# Patient Record
Sex: MALE | URBAN - METROPOLITAN AREA
[De-identification: names, ages, dates, MRNs, and addresses within clinical notes are randomized per-mention and may not be internally consistent; named-entity substitution may affect disease eponyms.]

---
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
## 2023-01-11 ENCOUNTER — TELEPHONE (OUTPATIENT)
Dept: TRANSPLANT | Facility: CLINIC | Age: 23
End: 2023-01-11

## 2023-01-11 DIAGNOSIS — Z00.5 TRANSPLANT DONOR EVALUATION: Primary | ICD-10-CM

## 2023-01-11 NOTE — TELEPHONE ENCOUNTER
Completed BREEZE questionnaire reviewed and independent living donor advocate assessment completed. Spoke with patient to arrange preliminary crossmatch. Lab appointment scheduled.  All questions were answered and patient verbalized understanding.

## 2023-01-17 ENCOUNTER — LAB VISIT (OUTPATIENT)
Dept: LAB | Facility: HOSPITAL | Age: 23
End: 2023-01-17
Payer: MEDICARE

## 2023-01-17 DIAGNOSIS — Z00.5 TRANSPLANT DONOR EVALUATION: ICD-10-CM

## 2023-01-17 LAB — ABO + RH BLD: NORMAL

## 2023-01-17 PROCEDURE — 81373 HLA I TYPING 1 LOCUS LR: CPT | Mod: PO,TXP | Performed by: NURSE PRACTITIONER

## 2023-01-17 PROCEDURE — 81376 HLA II TYPING 1 LOCUS LR: CPT | Mod: 59,PO,TXP | Performed by: NURSE PRACTITIONER

## 2023-01-17 PROCEDURE — 81373 HLA I TYPING 1 LOCUS LR: CPT | Mod: 59,PO,TXP | Performed by: NURSE PRACTITIONER

## 2023-01-17 PROCEDURE — 81376 HLA II TYPING 1 LOCUS LR: CPT | Mod: PO,TXP | Performed by: NURSE PRACTITIONER

## 2023-01-17 PROCEDURE — 86900 BLOOD TYPING SEROLOGIC ABO: CPT | Mod: TXP | Performed by: NURSE PRACTITIONER

## 2023-01-17 PROCEDURE — 36415 COLL VENOUS BLD VENIPUNCTURE: CPT | Mod: TXP | Performed by: NURSE PRACTITIONER

## 2023-01-19 LAB
HLATY INTERPRETATION: NORMAL
HLATY INTERPRETATION: NORMAL

## 2023-01-24 ENCOUNTER — TELEPHONE (OUTPATIENT)
Dept: TRANSPLANT | Facility: CLINIC | Age: 23
End: 2023-01-24
Payer: MEDICARE

## 2023-01-24 DIAGNOSIS — Z00.5 TRANSPLANT DONOR EVALUATION: Primary | ICD-10-CM

## 2023-01-24 NOTE — TELEPHONE ENCOUNTER
Spoke with donor regarding needing to repeat ABO.  Appointment set for tomorrow.  All questions answered

## 2023-01-25 ENCOUNTER — TELEPHONE (OUTPATIENT)
Dept: TRANSPLANT | Facility: CLINIC | Age: 23
End: 2023-01-25
Payer: MEDICARE

## 2023-01-25 ENCOUNTER — LAB VISIT (OUTPATIENT)
Dept: LAB | Facility: HOSPITAL | Age: 23
End: 2023-01-25
Payer: MEDICARE

## 2023-01-25 DIAGNOSIS — Z00.5 TRANSPLANT DONOR EVALUATION: Primary | ICD-10-CM

## 2023-01-25 DIAGNOSIS — Z00.5 TRANSPLANT DONOR EVALUATION: ICD-10-CM

## 2023-01-25 LAB — ABO + RH BLD: NORMAL

## 2023-01-25 PROCEDURE — 86900 BLOOD TYPING SEROLOGIC ABO: CPT | Mod: TXP | Performed by: NURSE PRACTITIONER

## 2023-01-25 PROCEDURE — 36415 COLL VENOUS BLD VENIPUNCTURE: CPT | Mod: TXP | Performed by: NURSE PRACTITIONER

## 2023-01-25 NOTE — TELEPHONE ENCOUNTER
Patient notified that the results of the crossmatch with his father show that they are compatible. Patient states he would like to proceed with the medical evaluation. Required testing was discussed including infectious disease and HIV testing. Opportunity provided for questions. Informed that he will be contacted to schedule appointments. Asked to  supplies tomorrow in transplant lab.  All questions were answered and patient verbalized understanding.

## 2023-01-25 NOTE — TELEPHONE ENCOUNTER
Patient notified that the results of the crossmatch with his dad show that they are not compatible. Patient states is unsure if he would want to participate in the paired exchange program.  Opportunity provided for questions. Will keep patient information and touch base with him if other donors do not work out.  All questions were answered and patient verbalized understanding.

## 2023-01-27 ENCOUNTER — TELEPHONE (OUTPATIENT)
Dept: TRANSPLANT | Facility: CLINIC | Age: 23
End: 2023-01-27
Payer: MEDICARE

## 2023-01-27 NOTE — TELEPHONE ENCOUNTER
Spoke with donor, confirmed appointment for 1/31.  24-hour urine instructions reviewed.  All questions answered.

## 2023-01-31 ENCOUNTER — HOSPITAL ENCOUNTER (OUTPATIENT)
Dept: RADIOLOGY | Facility: HOSPITAL | Age: 23
Discharge: HOME | End: 2023-01-31
Attending: TRANSPLANT SURGERY
Payer: MEDICARE

## 2023-01-31 ENCOUNTER — HOSPITAL ENCOUNTER (OUTPATIENT)
Dept: CARDIOLOGY | Facility: CLINIC | Age: 23
Discharge: HOME | End: 2023-01-31
Payer: MEDICARE

## 2023-01-31 ENCOUNTER — HOSPITAL ENCOUNTER (OUTPATIENT)
Dept: RADIOLOGY | Facility: HOSPITAL | Age: 23
Discharge: HOME | End: 2023-01-31
Attending: NURSE PRACTITIONER
Payer: MEDICARE

## 2023-01-31 ENCOUNTER — OFFICE VISIT (OUTPATIENT)
Dept: TRANSPLANT | Facility: CLINIC | Age: 23
End: 2023-01-31
Payer: MEDICARE

## 2023-01-31 VITALS
DIASTOLIC BLOOD PRESSURE: 86 MMHG | HEIGHT: 65 IN | WEIGHT: 152.56 LBS | SYSTOLIC BLOOD PRESSURE: 144 MMHG | OXYGEN SATURATION: 99 % | HEART RATE: 91 BPM | TEMPERATURE: 97 F | BODY MASS INDEX: 25.42 KG/M2 | RESPIRATION RATE: 16 BRPM

## 2023-01-31 DIAGNOSIS — Z00.5 WILLING TO BE KIDNEY DONOR: Primary | ICD-10-CM

## 2023-01-31 DIAGNOSIS — Z00.5 TRANSPLANT DONOR EVALUATION: ICD-10-CM

## 2023-01-31 PROCEDURE — 93010 EKG 12-LEAD: ICD-10-PCS | Mod: S$PBB,TXP,, | Performed by: INTERNAL MEDICINE

## 2023-01-31 PROCEDURE — 99203 OFFICE O/P NEW LOW 30 MIN: CPT | Mod: S$PBB,TXP,, | Performed by: TRANSPLANT SURGERY

## 2023-01-31 PROCEDURE — 97802 MEDICAL NUTRITION INDIV IN: CPT | Mod: PBBFAC,TXP

## 2023-01-31 PROCEDURE — 74174 CTA ABDOMEN AND PELVIS: ICD-10-PCS | Mod: 26,TXP,, | Performed by: INTERNAL MEDICINE

## 2023-01-31 PROCEDURE — 71046 X-RAY EXAM CHEST 2 VIEWS: CPT | Mod: TC,TXP

## 2023-01-31 PROCEDURE — 99999 PR PBB SHADOW E&M-EST. PATIENT-LVL IV: ICD-10-PCS | Mod: PBBFAC,TXP,,

## 2023-01-31 PROCEDURE — 93010 ELECTROCARDIOGRAM REPORT: CPT | Mod: S$PBB,TXP,, | Performed by: INTERNAL MEDICINE

## 2023-01-31 PROCEDURE — 74174 CTA ABD&PLVS W/CONTRAST: CPT | Mod: TC,TXP

## 2023-01-31 PROCEDURE — 71046 X-RAY EXAM CHEST 2 VIEWS: CPT | Mod: 26,TXP,, | Performed by: RADIOLOGY

## 2023-01-31 PROCEDURE — 99205 OFFICE O/P NEW HI 60 MIN: CPT | Mod: S$PBB,TXP,, | Performed by: NURSE PRACTITIONER

## 2023-01-31 PROCEDURE — 99214 OFFICE O/P EST MOD 30 MIN: CPT | Mod: PBBFAC,25,TXP

## 2023-01-31 PROCEDURE — 71046 XR CHEST PA AND LATERAL: ICD-10-PCS | Mod: 26,TXP,, | Performed by: RADIOLOGY

## 2023-01-31 PROCEDURE — 99999 PR PBB SHADOW E&M-EST. PATIENT-LVL IV: CPT | Mod: PBBFAC,TXP,,

## 2023-01-31 PROCEDURE — 99203 PR OFFICE/OUTPT VISIT, NEW, LEVL III, 30-44 MIN: ICD-10-PCS | Mod: S$PBB,TXP,, | Performed by: TRANSPLANT SURGERY

## 2023-01-31 PROCEDURE — 99205 PR OFFICE/OUTPT VISIT, NEW, LEVL V, 60-74 MIN: ICD-10-PCS | Mod: S$PBB,TXP,, | Performed by: NURSE PRACTITIONER

## 2023-01-31 PROCEDURE — 25500020 PHARM REV CODE 255: Mod: TXP | Performed by: TRANSPLANT SURGERY

## 2023-01-31 PROCEDURE — 74174 CTA ABD&PLVS W/CONTRAST: CPT | Mod: 26,TXP,, | Performed by: INTERNAL MEDICINE

## 2023-01-31 PROCEDURE — 93005 ELECTROCARDIOGRAM TRACING: CPT | Mod: PBBFAC,TXP | Performed by: INTERNAL MEDICINE

## 2023-01-31 RX ADMIN — IOHEXOL 100 ML: 350 INJECTION, SOLUTION INTRAVENOUS at 05:01

## 2023-01-31 NOTE — PROGRESS NOTES
"DONOR TEACHING NOTE    Met with Franklin Benitez today in clinic to review living donor education.        Topics covered included:  Kidney donation is done voluntarily and of the donor's free will.  Process can stop at any time.   Better success rates than cadaveric donation, shorter waiting time for the recipient: less than the 3-5 year wait on the list, more time to prepare: tests/surgery can be planned  Laboratory studies of blood and urine.  Health exams: records of GYN/pap/mammogram (females); evaluation by transplant team and a psychiatrist (if indicated); diagnostic Tests: CXR, EKG, TB skin test, 24-hour urine collection, renal scan, CT of abdomen to assess kidney anatomy, and stress test (if indicated)  Team will review workup for approval.  Copy of the approved criteria for donation given to patient.  Surgeon will decide which kidney will be donated.   Benefits of laparoscopic nephrectomy: less pain, shorter hospital stay, a shorter recovery period.  Risks discussed: bleeding, deep vein thrombosis, pulmonary embolus, the need for re-operation.  Your operation may be converted to an "open" procedure if the surgeon feels it is medically necessary.  To recovery room, transfer to TSU, if space allows or semi-private room.  Zhou catheter/IV, average hospital stay is 1 day.  At discharge the cost of any prescriptions is the donor's responsibility.  Post-operative visit 4 weeks after surgery or prior to returning to work, unless a complication arises and you need to be seen sooner.  Off of work for about 3 to 6 weeks, no driving for at least the first 3 weeks.  General surgical complications: infection, allergic reaction, and anesthesia.   Long life considerations of living with one kidney: risk of HTN and kidney failure   Following up with PCP 6 months after donation then yearly thereafter to monitor kidney function.  This should include weight, labs, urinalysis, and a blood pressure check.  We are required to " report your progress to [facility] at 6 months, 1 year, and 2 years post-donation.     Written educational material provided. Informed consent reviewed and signed. All questions were answered and patient verbalized understanding.     Patient is a suitable candidate for living kidney donation.

## 2023-01-31 NOTE — PROGRESS NOTES
Kidney Transplant Donor Evaluation    Subjective:       CC:  Initial evaluation of kidney donor candidacy.    HPI:  Mr. Benitez is a 22 y.o. year old Black or  male who has presented to be evaluated as a potential living related donor for his father who has ESRD secondary to HTN.  Mr. Benitez reports being here without coercion, payment, guilt or other alternative motives other than wanting to help someone with kidney disease.    Patient denies any history of coronary artery disease, stroke, seizure disorder, chronic obstructive pulmonary disease, liver disease, kidney stones, gallstones, deep venous thrombosis, pulmonary embolism, recurrent urinary tract infections or malignancies.    Patient no medical history. He was full-term at birth (he and his twin brother). No childhood illness. He follows with his primary doctor yearly. Reports BP last visit was 127/80. He plays basketball everyday without issue. He is currently in school majoring in biology and thinking about doing dentistry.     Body mass index is 25.11 kg/m².    History reviewed. No pertinent past medical history.    History reviewed. No pertinent surgical history.    Family History   Problem Relation Age of Onset    Kidney disease Father     Hypertension Father     No Known Problems Sister     No Known Problems Brother        Social History     Tobacco Use    Smoking status: Never    Smokeless tobacco: Never   Substance Use Topics    Alcohol use: Yes     Comment: 1 drink monthly    Drug use: Never       No current outpatient medications on file.      Review of Systems   Constitutional:  Negative for appetite change, chills, fatigue and fever.   HENT:  Negative for trouble swallowing.    Respiratory:  Negative for cough, chest tightness, shortness of breath and wheezing.    Cardiovascular:  Negative for chest pain, palpitations and leg swelling.   Gastrointestinal:  Negative for abdominal pain, constipation, diarrhea and nausea.  "  Genitourinary:  Negative for difficulty urinating, frequency and urgency.   Musculoskeletal:  Negative for arthralgias and myalgias.   Skin:  Negative for rash.   Neurological:  Negative for dizziness, weakness, light-headedness and headaches.   Psychiatric/Behavioral:  Negative for sleep disturbance.      Objective:  BP (!) 174/87 (BP Location: Left arm, Patient Position: Sitting, BP Method: Medium (Automatic))   Pulse 66   Temp 97.3 °F (36.3 °C) (Temporal)   Resp 16   Ht 5' 5.35" (1.66 m)   Wt 69.2 kg (152 lb 8.9 oz)   SpO2 99%   BMI 25.11 kg/m²     BP (!) 144/86 (BP Location: Right arm, Patient Position: Sitting, BP Method: Medium (Automatic))   Pulse 91   Temp 97.3 °F (36.3 °C) (Temporal)   Resp 16   Ht 5' 5.35" (1.66 m)   Wt 69.2 kg (152 lb 8.9 oz)   SpO2 99%   BMI 25.11 kg/m²      Physical Exam  Constitutional:       General: He is not in acute distress.     Appearance: He is well-developed. He is not diaphoretic.   Cardiovascular:      Rate and Rhythm: Normal rate and regular rhythm.      Heart sounds: Normal heart sounds. No murmur heard.    No friction rub. No gallop.   Pulmonary:      Effort: Pulmonary effort is normal. No respiratory distress.      Breath sounds: Normal breath sounds. No wheezing or rales.   Abdominal:      General: Bowel sounds are normal. There is no distension.      Palpations: Abdomen is soft.      Tenderness: There is no abdominal tenderness.   Musculoskeletal:         General: No tenderness. Normal range of motion.   Skin:     General: Skin is warm and dry.      Findings: No rash.      Nails: There is no clubbing.   Neurological:      Mental Status: He is alert and oriented to person, place, and time.   Psychiatric:         Behavior: Behavior normal.       Labs:  1/31/2023: Creatinine 1.2 mg/dL (Ref range: 0.5 - 1.4 mg/dL); Creatinine, Urine 70.0 mg/dL (Ref range: 23.0 - 375.0 mg/dL); BUN 10 mg/dL (Ref range: 6 - 20 mg/dL)  1/31/2023: Nitrite, UA Negative (Ref range: " Negative)  ABO type: A POS    Assessment:     1. Willing to be kidney donor        Plan:   He would like to think about APOL1 testing  Needs BP home readings    Donor Candidacy:   Based on the given information, Mr. Benitez appears to be a suitable candidate for kidney donation.  A final recommendation will be made by the selection committee after reviewing his complete workup.    Stefany Webb NP       Counseling:   I discussed with Mr. Benitez that donation is voluntary and reiterated it should be done willingly and for altruistic reasons only.  I reviewed that no payment should be received for donating.  I also discussed that coercion, guilt, pressure, or feelings of obligation are not appropriate reasons to donate.  The option to withdraw at any time was emphasized.  Mr. Benitez was reminded that a medical opt out can be given to protect his confidentiality, and no member of the transplant team will discuss specifics of his health or medical/social history with anyone else without permission.  The need for lifelong routine medical follow-up for optimal health, including routine health maintenance was reviewed.    We also discussed the long term risks associated with kidney donation.  I told the patient that his GFR should return to within 75-80% of pre-donation level within six months of donation.  I informed the patient that there is a small risk of developing albuminuria and hypertension following donor nephrectomy.  I also informed the patient that based on current literature, the risk of developing end-stage renal disease following donor nephrectomy is similar to the general population.    Patient advised that it is recommended that all transplant candidates, and their close contacts and household members receive Covid vaccination.    I reviewed with Mr. Benitez available lab results and other diagnostics from the evaluation process    Additional Counseling:   The patient was counseled on the need for regular  follow-up with a primary care physician for blood pressure and cholesterol screening.  The importance of age appropriate health screening was also emphasized.    Follow-up:   prn    Altogether, 30 minutes of this encounter were spent on counseling, which was greater than 50% of the total visit time.

## 2023-01-31 NOTE — PROGRESS NOTES
REASON FOR VISIT:   Potential kidney donor; elevated triglycerides     PAST MEDICAL HX:   No significant medical hx     LABS:   Triglycerides 167     WT: 69.2 kg (152 lb)  BMI: 25.11 kg/m2     NUTRITION HX:   Pt and mother present. Pt reports normal appetite with no N/V/D/C. Due to pt living at home this semester, he has been eating better. Pt plays basketball and goes to the gym daily for 1 hr total. Pt reports meeting with is PCP annually.     INTERVENTION/EDUCATION:  Reviewed Fat Restricted Nutrition Therapy handout (general information, foods recommended, foods not recommended, sample menus).  Encouraged physical activity daily, regular exercise as tolerated, stay mobile.     Patient voiced understanding of education & goals. Contact information was provided & will f/u as needed at clinic visits.     Consultation Time: 15 minutes

## 2023-01-31 NOTE — PROGRESS NOTES
"TRANSPLANT DONOR PSYCHOSOCIAL ASSESSMENT    Franklin Benitez  [address]  [place] [address]  Telephone Information:   Mobile [phone]     Home [phone] (home)  Work  There is no work phone number on file.  E-mail  [email or web address]    PHS Increased Risk Behavioral Questionnaire reviewed by : Yes   addressed any PHS increased risk behaviors or concerns. Resources and education provided as appropriate.    Sex: male  YOB: 2000  Age: 22 y.o.    Encounter Date: 1/31/2023  [place] Citizen: yes  Primary Language: English   Needed: no    Potential Recipient: Duane Parker  Clinic Number: [id number]  Donor's Relationship to Patient: son    Emergency Contact:  Name: Sparkle Benitez  Relationship: mother  Address: same as patient  Phone Numbers:  [phone] (mobile)    Family/Social Support:   Number of dependents/: none  Marital history: never   Other family dynamics: parents are living and together, one twin brother, two sisters and two other brothers.  Pt reports he and his twin are the "middle children".    Household Composition:  Name: Duane and Nancy Parker  Age: 51 and 46  Relationship: mother and father  Does person drive? yes both drive    Name: Rosa Benitez [phone]  Age: 22  Relationship:  twin brother  Does person drive? yes    Do you and your caregivers have access to reliable transportation? yes  PRIMARY CAREGIVER: Rosa Benitez will be primary caregiver, phone number [phone].      provided in-depth information to patient and caregiver regarding pre- and post-transplant caregiver role.   strongly encourages patient and caregiver to have concrete plan regarding post-transplant care giving, including back-up caregiver(s) to ensure care giving needs are met as needed.    Patient and Caregiver states understanding all aspects of caregiver role/commitment and is able/willing/committed to being caregiver to " the fullest extent necessary.    Patient and Caregiver verbalizes understanding of the education provided today and caregiver responsibilities.         remains available. Patient and Caregiver agree to contact  in a timely manner if concerns arise.      Able to take time off work without financial concerns: yes.     Additional Significant Others who will Assist with Transplant:  Name: Sparkle Benitez  Age: 46  City: NO State: [place]  Relationship: mother  Does person drive? yes    Living Will: no  Healthcare Power of : no  Advance Directives on file: <no information> per medical record.  Verbally reviewed LW/HCPA information.   provided patient with copy of LW/HCPA documents and provided education on completion of forms.    Education: 3 years of college  Reading Ability: college  Reports difficulty with: N/A  Learns [employer]:  visual and hands on     Status: no  VA Benefits: no     Employment:  full time student  Fundraising and [employer] information provided to patient.  Patient and Caregiver verbalizes understanding.   remains available.    Spouse/Significant Other Employment: mother is homemaker, brother is student.    Insurance: see potential recipient's insurance for donor coverage.    Does the donor have health insurance? No    Patient and Caregiver verbalizes clear understanding that patient may experience difficulty obtaining and/or be denied insurance coverages post-surgery.  This includes and is not limited to disability insurance, life insurance, health insurance, burial insurance, long term care insurance, and other insurances.  Patient also reports understanding that future health concerns related to or unrelated to transplantation may not be covered by patient's insurance.  Resources and information provided and reviewed.      Patient and Caregiver provides verbal permission to release any necessary information to outside resources for patient  care and discharge planning.  Resources and information provided and reviewed.      Infusion Service: patient utilizing? no  Home Health: patient utilizing? no  DME: no  ADLS:  Pt states current and expected compliance with all healthcare recommendations.     Adherence:   Pt states current and expected compliance with all healthcare recommendations. .   Adherence education and counseling provided    Per History Section:  History reviewed. No pertinent past medical history.  Social History     Tobacco Use    Smoking status: Never    Smokeless tobacco: Never   Substance Use Topics    Alcohol use: Yes     Comment: 1 drink monthly     Social History     Substance and Sexual Activity   Drug Use Never     Social History     Substance and Sexual Activity   Sexual Activity Not on file       Per Today's Psychosocial:  Tobacco: none, patient denies any use.  Alcohol:  one drink monthly .  Illicit Drugs/Non-prescribed Medications: none, patient denies any use.    Patient and Caregiver states clear understanding of the potential impact of substance use as it relates to donor candidacy and is agreeable to random substance screening.  Substance abstinence/cessation counseling, education and resources provided and reviewed.     Arrests/DWI/Treatment/Rehab: patient denies    Psychiatric History:    Mental Health: Pt denies any mental health concerns  Psychiatrist/Counselor: denies  Medications:  denies  Suicide/Homicide Issues: Pt denies current or past suicidal/homicidal ideation.    Safety at home: Pt denies any home safety concerns.     Donation Knowledge/Expectations: Patient and Caregiver states having clear understanding and realistic expectations regarding the potential risks and potential benefits of organ transplantation and organ donation and agrees to discuss with health care team members and support system members, as well as to utilize available resources and express questions and/or concerns in order to further  "facilitate the patients informed decision-making.  Resources and information provided and reviewed. .    Patient interviewed alone for the remainder of the visit.  Decision-making Process: Patient states he learned of father's kidney failure late summer 2022 and father started dialysis in November 2022.  He began to recognize his father's health was failing when he stopped doing certain chores around the house.  His father asked pt to help him wash his car and had a syncope in the presence of patient.  This is when pt decided it was time to get evaluated as a donor.  Other siblings are willing to donate, but per pt, he appears to be the best candidate at this time.  Patient states understanding that transplant and donation are not a guarantee that the donated organ will function.  Patient states understanding of kidney treatment options available for kidney patients, psychosocial aspects surrounding organ donation and transplantation as well as recovery.  Patient states he understands that once the kidney is donated, "It's like a Barbara gift.  He can do with it what he wants."  Patient also states clear understanding that patient may choose to not donate at any time prior to surgery taking place, and that patient confidentiality is protected.  Patient reports expected compliance with health care regime and states understanding of importance of compliance.  Educational information provided.    Patient reports having a clear understanding  that risks and benefits may be involved with organ transplantation and with organ donation and  of the treatment options available to a potential transplant recipient. Patient and Caregiver reports clear understanding that psychosocial risk factors which may affect patient, including but not limited to feelings of depression, generalized anxiety, anxiety regarding dependence on others, post traumatic stress disorder, feelings of guilt and other emotional and/or mental concerns, " and/or exacerbation of existing mental health concerns.     Detailed resources and education provided and discussed. Patient agrees to access appropriate resources in a timely manner as needed and to communicate concerns appropriately.      Feelings or Concerns: . Patient denies feelings of coercion, pressure or obligation to donate. Patient states that patient is not receiving any compensation for organ donation. Patient reports motivation to pursue organ donation at this time.     Patient reports having clear and realistic expectations and understanding of the many psychosocial aspects involved with being a living organ donor, including but not limited to costs, compliance, medications, lab work, procedures, appointments, financial planning, preparedness, timely and appropriate communication of concerns, abstinence from non-prescribed drugs or substances, importance of adherence to and follow-through with all health care team recommendations, participation in health care and treatment planning, utilization of resources and follow-through, mental health counseling as needed and/or recommended, and the patient and caregiver responsibilities.  Patient states having a clear understanding of possible difficulty obtaining or possibility of being denied insurance coverage post-surgery.  This includes and is not limited to disability insurance, life insurance, health insurance, burial insurance, long-term care insurance and other insurances.  Educational and resource information provided and reviewed.  Patient also reports understanding that future health concerns related to or unrelated to organ donation may not be covered by patients insurance.    Coping: Identify Patient & Caregiver Strategies to [place]:   1. Currently & Pre-transplant - family support, girlfriend, basketball   2. At the time of organ donation surgery - family support   3. During post-Organ donation & Recovery Period - family support    Interview  Behavior: Franklin presents as alert and oriented x 4, pleasant, good eye contact, well groomed, recall good, concentration/judgement good, average intelligence, calm, communicative, cooperative, and asking and answering questions appropriately.  He was accompanied by his mother who presented as supportive.      Suitability for Donation: Franklin Beintez presents as an acceptable candidate for donation at this time.    Recommendations/Additional Comments: Recommend pt contact transplant team for any questions or concerns he may have during the evaluation process.

## 2023-01-31 NOTE — PROGRESS NOTES
PHARM.D. PRE-TRANSPLANT NOTE:    This patient's medication therapy was evaluated as part of his pre-transplant evaluation.      The following general pharmacologic concerns were noted: no meds    The following concerns for post-operative pain management were noted: none          No current outpatient medications on file.     No current facility-administered medications for this visit.           I am available for consultation and can be contacted, as needed by the other members of the Transplant team.

## 2023-01-31 NOTE — PROGRESS NOTES
Transplant Surgery Kidney Donor Evaluation     Referring Physician:     Subjective:     Chief Complaint: Franklin Benitez is a 22 y.o. year old male who presents today wishing to be evaluated as a potential living related donor for father.    History of Present Illness:  Franklin reports being here without coercion, payment, guilt, or other alternative motives other than wanting to help someone with kidney disease.    External provider notes reviewed: Yes    Review of Systems  Objective:   Physical Exam  Constitutional:       Appearance: Normal appearance. He is normal weight.   Abdominal:      General: Abdomen is flat. There is no distension.      Palpations: Abdomen is soft. There is no mass.      Hernia: No hernia is present.   Neurological:      Mental Status: He is alert.     ABO type: A POS    Diagnostics:  The following labs have been reviewed: NA  The following radiology images have been independently reviewed and interpreted: NA    Diagnoses:  No diagnosis found.         Transplant Surgery - Candidacy   Assessment/Plan:     Donor Candidacy: Based on information available thus far, Franklin is an excellent candidate for living kidney donation.    Additional testing to be completed according to Written Order Guideline for Living Kidney Donor (LD) Evaluation (LDK-02).    Patient advised that it is recommended that all transplant candidates, and their close contacts and household members receive Covid vaccination.    Interpretation of tests and discussion of patient management involves all members of the multidisciplinary transplant team.  Chadwick Lake MD       Education: I discussed with the patient the requirements for donation including the compatibility of blood and tissue typing, healthy by physical examination and workup, as well as the desire to donate.  We discussed the risks related to surgery including the risks related to anesthesia, bleeding, infection, inability for surgery to be performed  laparoscopically, risks of reoperation as well as the risks of death.  We discussed the length of hospitalization, return to work times, as well as follow-up post-donation.    I discussed the possibility that living donor sometimes encounter problems obtaining health insurance or could have higher premium despite ongoing efforts of transplant professionals to educate insurance companies on this issue.    I discussed with Franklin that donation is a voluntary activity and reiterated it should be done willingly and for altruistic reasons only.  I reviewed that no payment should be made for donating.  I also discussed that coercion, guilt, pressure, or feelings of obligation are not appropriate reasons to donate.  The option to withdraw at any time was emphasized.  Franklin was reminded that a medical opt out can be given to protect his confidentiality, and no member of the transplant team will discuss specifics of his health or medical/social history with anyone else without permission.  The need for lifelong routine medical follow-up for optimal health, including routine health maintenance was reviewed.    Additionally, I discussed the need for our program to be able to contact living donors for UNOS reporting purposes for a minimum of 2 years.  Failure of our center to be able to provide such information could jeopardize our ability to continue to offer living donor transplants.  Franklin voices understanding and agrees to this follow-up.    I discussed the UNOS requirement for centers to report donor status for a minimum of two years. Franklin understands that failure to comply with requirement could have adverse consequences for our transplant program and agrees to cooperate with all our required follow-up.    I reviewed with Franklin available lab results and other diagnostics from the evaluation process.    Coronavirus disease (COVID-19) caused by severe acute respiratory virus coronavirus 2 (SARS-C0V 2) is associated  with increased mortality in solid organ transplant recipients (SOT) compared to non-transplant patients. Vaccine responses to vaccination are depressed against SARS-CoV2 compared to normal individuals but improve with third vaccination doses. Vaccination prior to SOT provides both the best opportunity for transplant candidates to develop protective immunity and to reduce the risk of serious COVID19 infections post transplantation. Organ transplant candidates at [facility] will be required to receive SARS-CoV-2 vaccination prior to being listed with a an active status, whenever possible. Exceptions will be made for disability related reasons or for sincerely held Alevism beliefs.

## 2023-02-01 ENCOUNTER — TELEPHONE (OUTPATIENT)
Dept: TRANSPLANT | Facility: CLINIC | Age: 23
End: 2023-02-01
Payer: MEDICARE

## 2023-02-01 DIAGNOSIS — Z00.5 WILLING TO BE KIDNEY DONOR: Primary | ICD-10-CM

## 2023-02-01 NOTE — TELEPHONE ENCOUNTER
Spoke with donor regarding additional testing/consults ordered.  Let him know we were referring him to hematology/oncology and ordering a blood pressure cuff.  No questions at this time.

## 2023-02-06 LAB
HLA DQA1 1: NORMAL
HLA DQA1 1: NORMAL
HLA DQA1 2: NORMAL
HLA DQA1 2: NORMAL
HLA DRB4 1: NORMAL
HLA DRB4 1: NORMAL
HLA REALTIMEPCR TYPING CLASSI&II INTERPRETATION: NORMAL
HLA REALTIMEPCR TYPING CLASSI&II INTERPRETATION: NORMAL
HLA-A 1 SERO. EQUIV: 2
HLA-A 1 SERO. EQUIV: 3
HLA-A 1: NORMAL
HLA-A 1: NORMAL
HLA-A 2 SERO. EQUIV: 3
HLA-A 2 SERO. EQUIV: 30
HLA-A 2: NORMAL
HLA-A 2: NORMAL
HLA-B 1 SERO. EQUIV: 18
HLA-B 1 SERO. EQUIV: 7
HLA-B 1: NORMAL
HLA-B 1: NORMAL
HLA-B 2 SERO. EQUIV: 53
HLA-B 2 SERO. EQUIV: 53
HLA-B 2: NORMAL
HLA-B 2: NORMAL
HLA-BW 1 SERO. EQUIV: 6
HLA-BW 1 SERO. EQUIV: 6
HLA-BW 2 SERO. EQUIV: 4
HLA-BW 2 SERO. EQUIV: 4
HLA-C 1: NORMAL
HLA-C 1: NORMAL
HLA-C 2: NORMAL
HLA-C 2: NORMAL
HLA-CW 1 SERO. EQUIV: 7
HLA-CW 1 SERO. EQUIV: 7
HLA-CW 2 SERO. EQUIV: 8
HLA-CW 2 SERO. EQUIV: 8
HLA-DPA1 1: NORMAL
HLA-DPA1 1: NORMAL
HLA-DPA1 2: NORMAL
HLA-DPA1 2: NORMAL
HLA-DPB1 1: NORMAL
HLA-DPB1 1: NORMAL
HLA-DPB1 2: NORMAL
HLA-DPB1 2: NORMAL
HLA-DQ 1 SERO. EQUIV: 2
HLA-DQ 1 SERO. EQUIV: 7
HLA-DQ 2 SERO. EQUIV: 6
HLA-DQ 2 SERO. EQUIV: 7
HLA-DQB1 1: NORMAL
HLA-DQB1 1: NORMAL
HLA-DQB1 2: NORMAL
HLA-DQB1 2: NORMAL
HLA-DRB1 1 SERO. EQUIV: 11
HLA-DRB1 1 SERO. EQUIV: 17
HLA-DRB1 1: NORMAL
HLA-DRB1 1: NORMAL
HLA-DRB1 2 SERO. EQUIV: 11
HLA-DRB1 2 SERO. EQUIV: 15
HLA-DRB1 2: NORMAL
HLA-DRB1 2: NORMAL
HLA-DRB3 1: NORMAL
HLA-DRB3 1: NORMAL
HLA-DRB3 2: NORMAL
HLA-DRB3 2: NORMAL
HLA-DRB345 1 SERO. EQUIV: 52
HLA-DRB345 1 SERO. EQUIV: 52
HLA-DRB345 2 SERO. EQUIV: 51
HLA-DRB345 2 SERO. EQUIV: NORMAL
HLA-DRB4 2: NORMAL
HLA-DRB4 2: NORMAL
HLA-DRB5 1: NORMAL
HLA-DRB5 1: NORMAL
HLA-DRB5 2: NORMAL
HLA-DRB5 2: NORMAL
RTPCR TESTING DATE: NORMAL
RTPCR TESTING DATE: NORMAL

## 2023-02-09 ENCOUNTER — OFFICE VISIT (OUTPATIENT)
Dept: HEMATOLOGY/ONCOLOGY | Facility: CLINIC | Age: 23
End: 2023-02-09
Payer: MEDICARE

## 2023-02-09 VITALS
BODY MASS INDEX: 26.19 KG/M2 | SYSTOLIC BLOOD PRESSURE: 184 MMHG | WEIGHT: 157.19 LBS | RESPIRATION RATE: 18 BRPM | OXYGEN SATURATION: 100 % | TEMPERATURE: 98 F | HEIGHT: 65 IN | HEART RATE: 97 BPM | DIASTOLIC BLOOD PRESSURE: 92 MMHG

## 2023-02-09 DIAGNOSIS — Z00.5 WILLING TO BE KIDNEY DONOR: ICD-10-CM

## 2023-02-09 PROCEDURE — 99203 OFFICE O/P NEW LOW 30 MIN: CPT | Mod: S$PBB,TXP,, | Performed by: INTERNAL MEDICINE

## 2023-02-09 PROCEDURE — 99999 PR PBB SHADOW E&M-EST. PATIENT-LVL III: ICD-10-PCS | Mod: PBBFAC,TXP,, | Performed by: INTERNAL MEDICINE

## 2023-02-09 PROCEDURE — 99999 PR PBB SHADOW E&M-EST. PATIENT-LVL III: CPT | Mod: PBBFAC,TXP,, | Performed by: INTERNAL MEDICINE

## 2023-02-09 PROCEDURE — 99203 PR OFFICE/OUTPT VISIT, NEW, LEVL III, 30-44 MIN: ICD-10-PCS | Mod: S$PBB,TXP,, | Performed by: INTERNAL MEDICINE

## 2023-02-09 PROCEDURE — 99213 OFFICE O/P EST LOW 20 MIN: CPT | Mod: PBBFAC,TXP | Performed by: INTERNAL MEDICINE

## 2023-02-10 ENCOUNTER — TELEPHONE (OUTPATIENT)
Dept: TRANSPLANT | Facility: CLINIC | Age: 23
End: 2023-02-10
Payer: MEDICARE

## 2023-02-10 NOTE — TELEPHONE ENCOUNTER
Spoke with donor to let him know we got his ABPM report and he saw oncology, so I will bring his case for selection committee on 2/17.  All questions answered.    ----- Message from Bob Granados sent at 2/10/2023  3:36 PM CST -----  Regarding: miss call  Pt returning Dahlia miss call    Call

## 2023-02-13 NOTE — PROGRESS NOTES
"  [facility] at [facility]     Chief Complaint:   Encounter Diagnosis   Name Primary?    Willing to be kidney donor            HPI:  Franklin Benitez is a 22 y.o. male who presents today for evaluation of elevated hgb    Hematology History  Patient presents to hematology clinic for elevated hgb on labs  Patient is attempting screening to be a live donor for kidney donation  Overall no current medical or surgical history          Social History     Tobacco Use    Smoking status: Never    Smokeless tobacco: Never   Substance Use Topics    Alcohol use: Yes     Comment: 1 drink monthly    Drug use: Never     Family History   Problem Relation Age of Onset    Kidney disease Father     Hypertension Father     No Known Problems Sister     No Known Problems Brother      No past medical history on file.  No past surgical history on file.    Patient Active Problem List   Diagnosis    Willing to be kidney donor       No current outpatient medications    Review of Systems:   Review of Systems   Constitutional: Negative.    HENT: Negative.     Respiratory: Negative.     Cardiovascular: Negative.    Gastrointestinal: Negative.    Musculoskeletal: Negative.    Neurological: Negative.    Endo/Heme/Allergies: Negative.    All other systems reviewed and are negative.    PHYSICAL EXAM:  BP (!) 184/92   Pulse 97   Temp 97.8 °F (36.6 °C) (Oral)   Resp 18   Ht 5' 5.35" (1.66 m)   Wt 71.3 kg (157 lb 3 oz)   SpO2 100%   BMI 25.88 kg/m²     General Appearance:    Alert, cooperative, no distress, appears stated age   Lungs:     Clear to auscultation bilaterally, respirations unlabored    Heart:    Regular rate and rhythm, S1 and S2 normal   Abdomen:     Soft, non-tender, bowel sounds active, no masses, no organomegaly   Extremities:   Extremities normal, atraumatic, no cyanosis or edema   Pulses:   2+ and symmetric all extremities   Skin:   Skin color, texture, turgor normal, no rashes " or lesions   Lymph nodes:   Cervical, supraclavicular, and axillary nodes normal           Pertinent Labs & Imaging:  Recent Labs   Lab Result Units 01/31/23  0721 02/09/23  1513   WBC K/uL 6.52 8.31   Hemoglobin g/dL 18.1* 16.2   Hematocrit % 53.5 49.1   Platelets K/uL 180 210     Recent Labs   Lab Result Units 01/31/23  0711 01/31/23  0721   Creatinine mg/dL 1.2 1.2   AST U/L  --  24   ALT U/L  --  22     No results for input(s): IRON, FERRITIN in the last 2160 hours.    Assessment & Plan:    1. Willing to be kidney donor  - Ambulatory referral/consult to Hematology / Oncology  - CBC W/ AUTO DIFFERENTIAL; Future    Full chart review of past labs and imaging, referring providers' notes, and consultants' reports.   Patient with elevated hgb on screening  Will repeat labs today. If normal, ok to proceed. No indication to follow up      Med Onc Chart Routing      Follow up with physician No follow up needed.   Follow up with [facility]    Infusion scheduling note    Injection scheduling note    Labs    Imaging    Pharmacy appointment    Other referrals          Total time of this visit, including time spent face to face with patient and/or via video/audio, and also in preparing for today's visit for MDM and documentation. (Medical Decision Making, including consideration of possible diagnoses, management options, complex medical record review, review of diagnostic tests and information, consideration and discussion of significant complications based on comorbidities, and discussion with providers involved with the care of the patient) 30 minutes. Greater than 50% was spent face to face with the patient counseling and coordinating care.      Staci Norman MD  02/13/2023

## 2023-02-17 ENCOUNTER — COMMITTEE REVIEW (OUTPATIENT)
Dept: TRANSPLANT | Facility: CLINIC | Age: 23
End: 2023-02-17
Payer: MEDICARE

## 2023-02-17 NOTE — COMMITTEE REVIEW
Franklin Benitez was presented at selection committee on 2/17/2023. Patient did not meet selection criteria for living kidney donation due to being young with borderline hypertension.      Spoke with patient on the phone, let him know committee's decision.  Let him know that his elevated blood pressures put him at risk for kidney problems later and that he needs to go see his primary care provider yearly to watch for hypertension.  Patient states understanding.  All questions answered.    Note written by Dahlia Jean RN.    ================================================================  I was present at the meeting and attest to the decision of the committee.    Gabriella Medina, DO  Transplant Nephrology